# Patient Record
Sex: FEMALE | Race: OTHER | Employment: FULL TIME | URBAN - METROPOLITAN AREA
[De-identification: names, ages, dates, MRNs, and addresses within clinical notes are randomized per-mention and may not be internally consistent; named-entity substitution may affect disease eponyms.]

---

## 2017-12-12 ENCOUNTER — OFFICE VISIT (OUTPATIENT)
Dept: FAMILY MEDICINE CLINIC | Age: 22
End: 2017-12-12

## 2017-12-12 VITALS
WEIGHT: 113 LBS | OXYGEN SATURATION: 100 % | BODY MASS INDEX: 20.8 KG/M2 | TEMPERATURE: 97.4 F | RESPIRATION RATE: 20 BRPM | HEART RATE: 66 BPM | HEIGHT: 62 IN | SYSTOLIC BLOOD PRESSURE: 110 MMHG | DIASTOLIC BLOOD PRESSURE: 95 MMHG

## 2017-12-12 DIAGNOSIS — R10.10 PAIN OF UPPER ABDOMEN: ICD-10-CM

## 2017-12-12 DIAGNOSIS — R10.9 STOMACH PAIN: Primary | ICD-10-CM

## 2017-12-12 DIAGNOSIS — K62.5 RECTAL BLEEDING: ICD-10-CM

## 2017-12-12 LAB
BILIRUB UR QL STRIP: NEGATIVE
GLUCOSE UR-MCNC: NEGATIVE MG/DL
HCG URINE, QL. (POC): NEGATIVE
KETONES P FAST UR STRIP-MCNC: NEGATIVE MG/DL
PH UR STRIP: 7.5 [PH] (ref 4.6–8)
PROT UR QL STRIP: NEGATIVE
SP GR UR STRIP: 1.01 (ref 1–1.03)
UA UROBILINOGEN AMB POC: NORMAL (ref 0.2–1)
URINALYSIS CLARITY POC: CLEAR
URINALYSIS COLOR POC: NORMAL
URINE BLOOD POC: NORMAL
URINE LEUKOCYTES POC: NEGATIVE
URINE NITRITES POC: NEGATIVE
VALID INTERNAL CONTROL?: YES

## 2017-12-12 NOTE — MR AVS SNAPSHOT
Visit Information Date & Time Provider Department Dept. Phone Encounter #  
 12/12/2017  1:20 PM Ammy Robertson NP [facility] [phone] [id number] Upcoming Health Maintenance Date Due Hepatitis A Peds Age 1-18 (1 of 2 - Standard Series) 8/16/1998 DTaP/Tdap/Td series (1 - Tdap) 8/16/2004 HPV AGE 9Y-26Y (1 of 3 - Female 3 Dose Series) 8/16/2008 Influenza Age 5 to Adult 8/1/2017 Allergies as of 12/12/2017  Review Complete On: 12/12/2017 By: Ammy Robertson NP No Known Allergies Current Immunizations  Never Reviewed No immunizations on file. Not reviewed this visit You Were Diagnosed With   
  
 Codes Comments Stomach pain    -  Primary ICD-10-CM: R10.9 ICD-9-CM: 536.8 Rectal bleeding     ICD-10-CM: K62.5 ICD-9-CM: 569.3 Pain of upper abdomen     ICD-10-CM: R10.10 ICD-9-CM: 789.09 Vitals BP Pulse Temp Resp Height(growth percentile) Weight(growth percentile) (!) 110/95 (BP 1 Location: Left arm, BP Patient Position: Sitting) 66 97.4 °F (36.3 °C) (Oral) 20 5' 1.5\" (1.562 m) 113 lb (51.3 kg) SpO2 BMI OB Status Smoking Status 100% 21.01 kg/m2 Having regular periods Former Smoker BMI and BSA Data Body Mass Index Body Surface Area 21.01 kg/m 2 1.49 m 2 Preferred Pharmacy Pharmacy Name Phone [phone], [address] [phone] Your Updated Medication List  
  
Notice  As of 12/12/2017  2:29 PM  
 You have not been prescribed any medications. We Performed the Following AMB POC URINALYSIS DIP STICK AUTO W/O MICRO [06301 CPT(R)] AMB POC URINE PREGNANCY TEST, VISUAL COLOR COMPARISON [20221 CPT(R)] REFERRAL TO GASTROENTEROLOGY [VBC22 Custom] Comments:  
 Please evaluate and treat patient for chronic ab pain, rectal bleeding. To-Do List   
 12/13/2017 Imaging:  US ABD COMP   
  
 12/13/2017 Imaging:  US PELV NON OBS Referral Information Referral ID Referred By Referred To  
  
 [id number] [facility], [address]   
   [address] [facility] [address] [place], [address] [facility] Visits Status Start Date End Date 1 New Request 12/12/17 12/12/18 If your referral has a status of pending review or denied, additional information will be sent to support the outcome of this decision. Patient Instructions 1) Ultrasound of abdomen and pelvic regions 2) Referral to Dr. Santa Gill - GI specialist.  Please make an appointment to see him after you have scan done. 3) Please eat small, frequent meals. Avoid fried and fatty foods. Follow GERD guidelines:  
1. GERD can occur when stomach acid or food flows back into your esophagus, and can cause heartburn, sore throat, dry cough and other symptoms. 2. Anti-reflux measures that can be used include raising the head of the bed, avoiding tight clothing or belts, avoiding eating late at night and not lying down shortly after mealtime and losing weight. 2. Avoid aspirin, ibuprofen, caffeine, peppermints, alcohol and tobacco, as these can make GERD symptoms worse. 3. OTC H2 blockers (such as Prilosec, Pepcid AC, Zantac) and/or antacids (such as Maalox, Tums) are often very helpful for occasional use. However, for persisting chronic or daily symptoms occur, prescription strength H2 blockers or a trial of PPI's are often used. Introducing [facility]! Agustín Paris introduces [employer] patient portal. Now you can access parts of your medical record, email your doctor's office, and request medication refills online. 1. In your internet browser, go to [email or web address]. [email or web address] system 2. Click on the First Time User? Click Here link in the Sign In box. You will see the New Member Sign Up page. 3. Enter your [employer]Way Access Code exactly as it appears below.  You will not need to use this code after youve completed the sign-up process. If you do not sign up before the expiration date, you must request a new code. · [facility] Access Code: [email or web address] Expires: 3/12/2018  2:29 PM 
 
4. Enter the last four digits of your Social Security Number (xxxx) and Date of Birth (mm/dd/yyyy) as indicated and click Submit. You will be taken to the next sign-up page. 5. Create a [facility] ID. This will be your [facility] login ID and cannot be changed, so think of one that is secure and easy to remember. 6. Create a [facility] password. You can change your password at any time. 7. Enter your Password Reset Question and Answer. This can be used at a later time if you forget your password. 8. Enter your e-mail address. You will receive e-mail notification when new information is available in [address]. 9. Click Sign Up. You can now view and download portions of your medical record. 10. Click the Download Summary menu link to download a portable copy of your medical information. If you have questions, please visit the Frequently Asked Questions section of the [facility] website. Remember, [facility] is NOT to be used for urgent needs. For medical emergencies, dial 911. Now available from your iPhone and Android! Please provide this summary of care documentation to your next provider. If you have any questions after today's visit, please call [phone].

## 2017-12-12 NOTE — PATIENT INSTRUCTIONS
1) Ultrasound of abdomen and pelvic regions     2) Referral to Dr. Kenney Petersen - GI specialist.  Please make an appointment to see him after you have scan done. 3) Please eat small, frequent meals. Avoid fried and fatty foods. Follow GERD guidelines:   1. GERD can occur when stomach acid or food flows back into your esophagus, and can cause heartburn, sore throat, dry cough and other symptoms. 2. Anti-reflux measures that can be used include raising the head of the bed, avoiding tight clothing or belts, avoiding eating late at night and not lying down shortly after mealtime and losing weight. 2. Avoid aspirin, ibuprofen, caffeine, peppermints, alcohol and tobacco, as these can make GERD symptoms worse. 3. OTC H2 blockers (such as Prilosec, Pepcid AC, Zantac) and/or antacids (such as Maalox, Tums) are often very helpful for occasional use. However, for persisting chronic or daily symptoms occur, prescription strength H2 blockers or a trial of PPI's are often used.

## 2017-12-12 NOTE — PROGRESS NOTES
Chief Complaint   Patient presents with    Abdominal Pain      after eating, belch a lot x 2-3 months       Reviewed record in preparation for visit and have obtained necessary documentation. Identified pt with two pt identifiers(name and ). Chief Complaint   Patient presents with    Abdominal Pain      after eating, belch a lot x 2-3 months       Vitals:    17 1335   BP: (!) 110/95   Pulse: 66   Resp: 20   Temp: 97.4 °F (36.3 °C)   TempSrc: Oral   SpO2: 100%   Weight: 113 lb (51.3 kg)   Height: 5' 1.5\" (1.562 m)   PainSc:   0 - No pain       Coordination of Care Questionnaire:  :     1) Have you been to an emergency room, urgent care clinic since your last visit? no   Hospitalized since your last visit? no             2) Have you seen or consulted any other health care providers outside of [address] [place] [address] since your last visit? no  (Include any pap smears or colon screenings in this section.)    3) In the event something were to happen to you and you were unable to speak on your behalf, do you have an Advance Directive/ Living Will in place stating your wishes? YES    Do you have an Advance Directive on file? no    4) Are you interested in receiving information on Advance Directives? NO    Patient is accompanied by self I have received verbal consent from Vicky Estrada to discuss any/all medical information while they are present in the room. Verbal order to do pregnancy test was negative. Urine collected and resulted through dipstick.

## 2017-12-12 NOTE — PROGRESS NOTES
S: Gaurang Young is a 23 y.o. female who presents with ab pain    Assessment/Plan:  1. Stomach pain  -2+months stomach pain, worse with eating  -pancreatitis vs gastroenteritis vs   -preg neg; UA neg (except blood)  - US ab/pelvis    2.  Rectal bleeding  -has seen blood on toilet paper for past 4 months  -anal fissure vs hemorrhoid vs polyps   - referral to GI - to be scheduled after US is complete    RTC for CPE     HPI:  Sx for at least 2-3 months  Wakes up in am - has pain and a lot of belching  Almost every day when she eats - up to 1/2 hour after eating  Pain can last 1/2 hour up to several hours     Pain:   Can be 10/10, but not all the time    Type: sharp, shooting pain  Progression: sometimes will last 3 -4 hours  No Radiation  No flank pain  Food on stomach makes it worse  No real pain when stomach is empty   Anything make it better - Tums didn't help   Anything make it worse - food  No F/C  No N/V  Urine and BM normal       Gas - burping, but not flatuence   Last BM - today   No blood in stool, but will see blood on toilet paper (for past 4 months)   No pain or frequency on urination  No trouble eating or swallowing  +acid reflux    No hx of ab surgery  No Chest pain, SOB, pulsation near navel, back pain  No trauma  No new foods or medicine  No  NSAIDs  No recent travels     Has had gradual weight loss of about 10lbs since 20yo  Exercise: occasional - bikes and runs when able    No Chest pain, SOB, pulsation near navel, back pain    Vocal cords have been hurting  Voice has hurt - dc and can't sing the way she used to  Can't hit notes properly  No hoarseness  Feels like burning sensation/inflammation     LMP: 12/11/17  Preg - neg  UA - Moderate Blood (having menses)   Sexually Active - men only  Birth Control - uses pull out method - discussed risk of pregnancy and switching to condom use with spermacide  Discussed PAP smear at 23 unless sx    PHQ over the last two weeks 12/15/2015   Little interest or pleasure in doing things Not at all   Feeling down, depressed or hopeless Not at all   Total Score PHQ 2 0       Social History:  Occupation:  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 Still had stomach aches before that, but has gotten  (doesn't eat at work); will eat late, depending on shift  Breakfast: bagel, toast or   Lunch: salad with chicken, fast food  Dinner: pasta, burger, chicken sandwich  Drinks:water, coffee  Alcohol: rarely 1x month   Drugs:none  Tobacco: October 2017 quit; smoking 3-7 cigs/day for 2 years     Review of Systems:  - Constitutional Symptoms: no fevers, chills, +weight loss  - Cardiovascular: no chest pain or palpitations  - Respiratory: no cough or shortness of breath  - Neurological: no numbness, tingling, or headaches  - Psychiatric: no depression or anxiety    I reviewed the following:  History reviewed. No pertinent past medical history. No Known Allergies    O: VS:   Visit Vitals    BP (!) 110/95 (BP 1 Location: Left arm, BP Patient Position: Sitting)    Pulse 66    Temp 97.4 °F (36.3 °C) (Oral)    Resp 20    Ht 5' 1.5\" (1.562 m)    Wt 113 lb (51.3 kg)    SpO2 100%    BMI 21.01 kg/m2         Data Reviewed:  Urine Pregnancy Test: neg  UA - mod blood    GENERAL: [place]  is in no acute distress. Non-toxic. No signs of dehydration. EYE: PERRLA. EOMs intact. Sclera anicteric without injection. MOUTH: mucous membranes pink and moist. Posterior pharynx normal with cobblestone appearance. No erythema, white exudate or obstruction. RESP: Breath sounds are symmetrical bilaterally. Unlabored without SOB. Speaking in full sentences. Clear to auscultation bilaterally anteriorly and posteriorly. No wheezes. No rales or rhonchi. CV: normal rate. Regular rhythm. S1, S2 audible. No murmur noted. No rubs, clicks or gallops noted. ABDOMEN: Flat without bulges or pulsations.    Soft and nondistended. No tenderness on palpation. No masses or organomegaly. No rebound tenderness, rigidity or guarding. Bowel sounds normal x 4 quadrants. Special Tests:  McBurney Negative  Rosving Sign Negative; Psoas Sign: negative  Bahena's sign: negative  CVA tenderness: none  HEME/LYMPH: peripheral pulses palpable 2+ x 4 extremities. No peripheral edema is noted. No cervical adenopathy noted. SKIN: Skin is warm and dry. Turgor is normal. No petechiae, no purpura, no rash. No cyanosis. No mottling, jaundice or pallor. Patient education was done. Advised on nutrition, physical activity, tobacco, alcohol and safety. Counseling included discussion of diagnosis, differentials, treatment options, prescribed treatment, warning signs and follow up. Medication risks/benefits, interactions and alternatives discussed with patient.    ______________________________________________________________________  Patient verbalized understanding and agreed to plan of care. Patient was given an after visit summary which included current diagnoses, medications and vital signs. Follow up with GI for ab pain.  RTC for CPE

## 2019-01-28 ENCOUNTER — OFFICE VISIT (OUTPATIENT)
Dept: PRIMARY CARE CLINIC | Age: 24
End: 2019-01-28

## 2019-01-28 ENCOUNTER — HOSPITAL ENCOUNTER (EMERGENCY)
Age: 24
Discharge: HOME | End: 2019-01-28
Attending: EMERGENCY MEDICINE
Payer: COMMERCIAL

## 2019-01-28 VITALS
DIASTOLIC BLOOD PRESSURE: 70 MMHG | RESPIRATION RATE: 16 BRPM | OXYGEN SATURATION: 100 % | HEART RATE: 68 BPM | SYSTOLIC BLOOD PRESSURE: 109 MMHG | TEMPERATURE: 98.5 F

## 2019-01-28 VITALS
OXYGEN SATURATION: 98 % | WEIGHT: 127 LBS | HEIGHT: 62 IN | BODY MASS INDEX: 23.37 KG/M2 | TEMPERATURE: 98 F | HEART RATE: 73 BPM | RESPIRATION RATE: 16 BRPM | DIASTOLIC BLOOD PRESSURE: 66 MMHG | SYSTOLIC BLOOD PRESSURE: 101 MMHG

## 2019-01-28 DIAGNOSIS — R45.851 SUICIDAL THOUGHTS: ICD-10-CM

## 2019-01-28 DIAGNOSIS — F32.A DEPRESSION, UNSPECIFIED DEPRESSION TYPE: Primary | ICD-10-CM

## 2019-01-28 DIAGNOSIS — R45.851 VERBALIZES SUICIDAL THOUGHTS: ICD-10-CM

## 2019-01-28 DIAGNOSIS — J02.9 SORE THROAT: Primary | ICD-10-CM

## 2019-01-28 LAB
AMPHET UR QL SCN: NEGATIVE
BARBITURATES UR QL SCN: NEGATIVE
BENZODIAZ UR QL: NEGATIVE
CANNABINOIDS UR QL SCN: POSITIVE
COCAINE UR QL SCN: NEGATIVE
DRUG SCRN COMMENT,DRGCM: ABNORMAL
ETHANOL SERPL-MCNC: <10 MG/DL
HCG UR QL: NEGATIVE
METHADONE UR QL: NEGATIVE
OPIATES UR QL: NEGATIVE
PCP UR QL: NEGATIVE
S PYO AG THROAT QL: NEGATIVE
VALID INTERNAL CONTROL?: YES

## 2019-01-28 PROCEDURE — 80307 DRUG TEST PRSMV CHEM ANLYZR: CPT

## 2019-01-28 PROCEDURE — 81025 URINE PREGNANCY TEST: CPT

## 2019-01-28 PROCEDURE — 99285 EMERGENCY DEPT VISIT HI MDM: CPT

## 2019-01-28 NOTE — PROGRESS NOTES
History of Present Illness     Patient Identification  Marcello Ruiz is a 26 y.o. female. Patient information was obtained from patient. History/Exam limitations: none. Patient presented voluntarily to the [facility] by personal vehicle    Chief Complaint   Cold Symptoms (Cough, runny nose and sore throat starting Saturday, chills, fatigue)      Patient presents with depression and suicidal thoughts/threats. Onset of symptoms was many years ago,  The patient reports remembering as far back as when she was a young girl having thoughts of killing herself. Symptoms are of severe severity and are unstable. Patient states symptoms have been exacerbated by the patient is unable to say what has caused her thoughts. Symptoms are associated with intent to harm self has means to carry out plan and by carbon monoxide. History obtained from: patient and Patient expressed intent to harm self with definite plan of has means to carry out plan and carbon monoxide. . Care prior to arrival consisted of nothing, with no relief. History reviewed. No pertinent past medical history.   Family History   Problem Relation Age of Onset    Elevated Lipids Mother     Hypertension Mother    [facility] Elevated Lipids Maternal Grandmother     Elevated Lipids Maternal Grandfather     No Known Problems Father        No Known Allergies  Social History     Socioeconomic History    Marital status: SINGLE     Spouse name: Not on file    Number of children: Not on file    Years of education: Not on file    Highest education level: Not on file   Social Needs    Financial resource strain: Not on file    Food insecurity - worry: Not on file    Food insecurity - inability: Not on file   [employer] needs - medical: Not on file   [employer] needs - non-medical: Not on file   Occupational History    Not on file   Tobacco Use    Smoking status: Former Smoker    Smokeless tobacco: Never Used   Substance and Sexual Activity    Alcohol use: Yes     Alcohol/week: 1.2 oz     Types: 2 Glasses of wine per week    Drug use: No    Sexual activity: Not on file   Other Topics Concern    Not on file   Social History Narrative    Not on file     Review of Systems   A comprehensive review of systems was negative except for that written in the HPI. Physical Exam     Visit Vitals  /66   Pulse 73   Temp 98 °F (36.7 °C) (Oral)   Resp 16   Ht 5' 1.5\" (1.562 m)   Wt 127 lb (57.6 kg)   LMP 12/26/2018   SpO2 98%   BMI 23.61 kg/m²     Visit Vitals  /66   Pulse 73   Temp 98 °F (36.7 °C) (Oral)   Resp 16   Ht 5' 1.5\" (1.562 m)   Wt 127 lb (57.6 kg)   LMP 12/26/2018   SpO2 98%   BMI 23.61 kg/m²     General:  Alert, cooperative, no distress, appears stated age. Head:  Normocephalic, without obvious abnormality, atraumatic. Eyes:  Conjunctivae/corneas clear. PERRL, EOMs intact. Fundi benign. Ears:  Normal TMs and external ear canals both ears. Nose: Nares normal. Septum midline. Mucosa normal. No drainage or sinus tenderness. Throat: Lips, mucosa, and tongue normal. Teeth and gums normal.   Neck: Supple, symmetrical, trachea midline, no adenopathy, thyroid: no enlargement/tenderness/nodules, no carotid bruit and no JVD. Back:   Symmetric, no curvature. ROM normal. No CVA tenderness. Lungs:   Clear to auscultation bilaterally. Chest wall:  No tenderness or deformity. Heart:  Regular rate and rhythm, S1, S2 normal, no murmur, click, rub or gallop. Breast Exam:  No tenderness, masses, or nipple abnormality. Abdomen:   Soft, non-tender. Bowel sounds normal. No masses,  No organomegaly. Genitalia:  Normal female without lesion, discharge or tenderness. Rectal:  Normal tone,  no masses or tenderness  Guaiac negative stool. Extremities: Extremities normal, atraumatic, no cyanosis or edema. Pulses: 2+ and symmetric all extremities. Skin: Skin color, texture, turgor normal. No rashes or lesions.    Lymph nodes: Cervical, supraclavicular, and axillary nodes normal.   Neurologic: CNII-XII intact. Normal strength, sensation and reflexes throughout. ED Course     Studies: None indicated. Records Reviewed: Old medical records. Treatments: None.     Consultations: contacted [facility] and they came to this office, spoke with the patient, and the patient voluntarily went with them to the crisis unit for evaluation    Disposition: patient taken to ED [facility] crisis unit by Trixie Kaye

## 2019-01-28 NOTE — ED PROVIDER NOTES
EMERGENCY DEPARTMENT HISTORY AND PHYSICAL EXAM 
 
 
Date: 1/28/2019 Patient Name: Dl Bolton History of Presenting Illness Chief Complaint Patient presents with  Mental Health Problem  
  suicidal Ideation History Provided By: Patient HPI: lD Bolton, 26 y.o. female with PMHx significant for depression, presents via police as an ECO to the ED for evaluation of the pt's SI. Patient was at the PCP to be seen for a cold and was asked depression screening questions and when they were positive, was referred to the ED. The pt states that she was planning to inhale CO2 via a CO2 tank in order to kill herself. She reports being depressed intermittently for the past couple years but has been constantly depressed for the past couple months. She reports having old self-inflicted cut wounds on her bilateral wrists, but denies any recent cut marks. She denies taking psychiatric medications, or seeing a therapist/psychiatrist. The pt reports that she would like to get help for her depression. The pt specifically denies experiencing abdominal pain, urinary sxs, fever, cough, chills, SOB, HA, CP, or N/V/D. PMHx: depression SHx: + EtOH, - tobacco, - illicit drugs There are no other complaints, changes, or physical findings at this time. PCP: None No current facility-administered medications on file prior to encounter. No current outpatient medications on file prior to encounter. Past History Past Medical History: No past medical history on file. Past Surgical History: No past surgical history on file. Family History: 
Family History Problem Relation Age of Onset  Elevated Lipids Mother  Hypertension Mother  Elevated Lipids Maternal Grandmother  Elevated Lipids Maternal Grandfather  No Known Problems Father Social History: 
Social History Tobacco Use  Smoking status: Former Smoker  Smokeless tobacco: Never Used Substance Use Topics  Alcohol use: Yes Alcohol/week: 1.2 oz Types: 2 Glasses of wine per week  Drug use: No  
 
 
Allergies: 
No Known Allergies Review of Systems Review of Systems Constitutional: Negative for chills and fever. HENT: Negative for congestion, rhinorrhea and sore throat. Respiratory: Negative for cough and shortness of breath. Cardiovascular: Negative for chest pain. Gastrointestinal: Negative for abdominal pain, diarrhea, nausea and vomiting. Genitourinary: Negative for dysuria and urgency. Skin: Negative for rash. Neurological: Negative for dizziness, light-headedness and headaches. Psychiatric/Behavioral: Positive for suicidal ideas. All other systems reviewed and are negative. Physical Exam  
Physical Exam  
Constitutional: She is oriented to person, place, and time. She appears well-developed and well-nourished. No distress. HENT:  
Head: Normocephalic and atraumatic. Eyes: Conjunctivae and EOM are normal. Pupils are equal, round, and reactive to light. Neck: Normal range of motion. Cardiovascular: Normal rate, regular rhythm and intact distal pulses. Pulmonary/Chest: Effort normal and breath sounds normal. No stridor. No respiratory distress. Abdominal: Soft. She exhibits no distension. There is no tenderness. Musculoskeletal: Normal range of motion. Neurological: She is alert and oriented to person, place, and time. Skin: Skin is warm and dry. Psychiatric: She has a normal mood and affect. She expresses suicidal ideation. She expresses suicidal plans. Nursing note and vitals reviewed. Diagnostic Study Results Labs - Recent Results (from the past 12 hour(s)) AMB POC RAPID STREP A Collection Time: 01/28/19  3:02 PM  
Result Value Ref Range VALID INTERNAL CONTROL POC Yes Group A Strep Ag Negative Negative ETHYL ALCOHOL Collection Time: 01/28/19  5:37 PM  
Result Value Ref Range ALCOHOL(ETHYL),SERUM <10 <10 MG/DL  
DRUG SCREEN, URINE Collection Time: 01/28/19  5:37 PM  
Result Value Ref Range AMPHETAMINES NEGATIVE  NEG    
 BARBITURATES NEGATIVE  NEG BENZODIAZEPINES NEGATIVE  NEG    
 COCAINE NEGATIVE  NEG METHADONE NEGATIVE  NEG    
 OPIATES NEGATIVE  NEG    
 PCP(PHENCYCLIDINE) NEGATIVE  NEG    
 THC (TH-CANNABINOL) POSITIVE (A) NEG Drug screen comment (NOTE) HCG URINE, QL. - POC Collection Time: 01/28/19  6:44 PM  
Result Value Ref Range Pregnancy test,urine (POC) NEGATIVE  NEG Radiologic Studies - No orders to display CT Results  (Last 48 hours) None CXR Results  (Last 48 hours) None Medical Decision Making I am the first provider for this patient. I reviewed the vital signs, available nursing notes, past medical history, past surgical history, family history and social history. Vital Signs-Reviewed the patient's vital signs. Patient Vitals for the past 12 hrs: 
 Temp Pulse Resp BP SpO2  
01/28/19 1919 98.5 °F (36.9 °C) 68 16 109/70   
01/28/19 1843 98.3 °F (36.8 °C) 63 16 103/64 100 % 01/28/19 1651 97.4 °F (36.3 °C) 76 18 104/81 98 % Records Reviewed: Nursing Notes and Old Medical Records Provider Notes (Medical Decision Making): On exam, patient is pleasant, well appearing. Plan to get UDS, Upreg, Etoh level. Crisis is in the ED to talk to the patient ED Course:  
Initial assessment performed. The patients presenting problems have been discussed, and they are in agreement with the care plan formulated and outlined with them. I have encouraged them to ask questions as they arise throughout their visit. PROGRESS NOTE: 
5:52 PM 
CRISIS states that a safety plan is being put in place, they are waiting to talk to one more family member Written by ISAEL Xie, as dictated by Fernando Ramos.  MD Jazmine. 
 
PROGRESS NOTE: 
6:16 PM 
 CRISIS reports that they have completed the safety plan and she is okay for discharge. Patient feels better given the follow up provided. Has support at home. Will d/c with strict return precautions Written by Zachariah Trujillo, ED Scribe, as dictated by Fernando Ramos. Pamella Reis MD. Critical Care Time:  
0 minutes Disposition: 
DISCHARGE NOTE 
7:15 PM 
The patient has been re-evaluated and is ready for discharge. Reviewed available results with patient. Counseled pt on diagnosis and care plan. Pt has expressed understanding, and all questions have been answered. Pt agrees with plan and agrees to F/U as recommended, or return to the ED if their sxs worsen. Discharge instructions have been provided and explained to the pt, along with reasons to return to the ED. Written by Zachariah Trujillo, ISAEL Scribe, as dictated by Fernando Ramos. MD Jazmine. 
 
PLAN: 
1. There are no discharge medications for this patient. 2.  
Follow-up Information Follow up With Specialties Details Why Contact Info [address] [place] [address] 
[phone] [facility] EMERGENCY DEPT Emergency Medicine  As needed, If symptoms worsen [address] [place] [address] 
[phone] Return to ED if worse Diagnosis Clinical Impression: 1. Depression, unspecified depression type Attestation: This note is prepared by Zachariah Trujillo, acting as Scribe for Fernando Ramos. MD Fernando Lucero. MD Jazmine: The scribe's documentation has been prepared under my direction and personally reviewed by me in its entirety. I confirm that the note above accurately reflects all work, treatment, procedures, and medical decision making performed by me.

## 2019-01-28 NOTE — PATIENT INSTRUCTIONS
Suicidal Thoughts and Behavior: Care Instructions  Your Care Instructions  You have been seen by a doctor because you've had thoughts about killing yourself. Maybe you have tried to do it. This is much different from having fleeting thoughts of death, which many people have from time to time. Your doctor and support team will work hard to help keep you safe. Your team may include a , a , and a counselor. Most people who think about suicide don't want to die. They think suicide will end their problems and pain. People who consider suicide often feel hopeless, helpless, and worthless. These thoughts can make a person feel that there is no other choice. But you do have a choice. Help is always available. The doctor and staff members are taking you and your pain very seriously. It is important to remember that there are people who are willing and able to talk with you about your suicidal thoughts. Treatment and close follow-up care can help you feel better about life. Thoughts of hopelessness and suicide may come from being depressed. Depression is a medical condition. When you have depression, there may be problems with activity levels in certain parts of your brain. Chemicals in your brain called neurotransmitters may be out of balance. But depression can be treated. Treatment for depression includes counseling, medicines, and lifestyle changes. With treatment, you can feel better. Your doctor doesn't want you to hurt yourself. He or she may ask you to sign a \"no harm\" agreement or contract. This contract is your promise that you will not hurt yourself between now and your next visit. Be completely honest with your doctor if you feel that you can't sign it. You will get help. Follow-up care is a key part of your treatment and safety. Be sure to make and go to all appointments, and call your doctor if you are having problems.  It's also a good idea to know your test results and keep a list of the medicines you take. How can you care for yourself at home? · Talk to someone. Reach out to family members, friends, your doctor, or a counselor. Be open and honest with them about your thoughts and feelings. · Be safe with medicines. Take your medicines exactly as prescribed. Call your doctor if you think you are having a problem with your medicine. · Avoid illegal drugs and alcohol. · Attend all counseling sessions recommended by your doctor. · Have someone take away sharp or dangerous objects, guns, and drugs from your home. · Keep the numbers for these national suicide hotlines: 6-462-358-TALK (2-319.484.4276) and 3-165-JTMUXRK (4-512.812.1117). When should you call for help? Call 911 anytime you think you may need emergency care. For example, call if:    · You feel you cannot stop from hurting yourself or someone else.   [facility] your doctor now or seek immediate medical care if:    · You have one or more warning signs of suicide. For example, call if:  ? You feel like giving away your possessions. ? You use illegal drugs or drink alcohol heavily. ? You talk or write about death. This may include writing suicide notes and talking about guns, knives, or pills. ? You start to spend a lot of time alone or spend more time alone than usual.     · You hear voices.     · You start acting in an aggressive way that's not normal for you.    Watch closely for changes in your health, and be sure to contact your doctor if you have any problems. Where can you learn more? Go to http://merced-gilmar.info/. Enter R301 in the search box to learn more about \"Suicidal Thoughts and Behavior: Care Instructions. \"  Current as of: September 11, 2018  Content Version: 11.9  © 0207-4528 [employer]. Care instructions adapted under license by [employer] (which disclaims liability or warranty for this information).  If you have questions about a medical condition or this instruction, always ask your healthcare professional. [place] [address] any warranty or liability for your use of this information.

## 2019-01-28 NOTE — LETTER
[address] 
[facility] EMERGENCY DEPT 
[address] [place] [address] [address] [phone] 
[phone] Work/School Note Date: 1/28/2019 To Whom It May concern: 
 
Emmanuel Meneses was seen and treated today in the emergency room by the following provider(s): 
Attending Provider: Ayala Lagos MD.

## 2019-01-28 NOTE — PROGRESS NOTES
Chief Complaint   Patient presents with    Cold Symptoms     Cough, runny nose and sore throat starting Saturday, chills, fatigue

## 2019-01-28 NOTE — PROGRESS NOTES
Pt reports 3 day history of cough, sore throat, has been at work around one person with sinus infection. Denies any fever/chills/n/v/d/. After speaking with the patient, 911 has been called and they are sending a deputy to this location. [place] Deputies are on scene, have been briefed, and now are talking with the patient. The patient was tearful but willing to talk with them. This patient comes in for a sore throat, coughing and just generalized not feeling good. In completing the depression screening her score were elevated. I began to discuss with her why she was here and how she was feeling she would not make eye contact, she has a flat affect, and a somber disposition. When discussing her thoughts she reports \"I have had these thoughts for a very long time\" and when asked about her plan she reports \"I was going to do carbon monoxide\" when asked about today and if she was currently thinking about suicide she reports \"yeah, I think about this all the time these days\", when asked if she felt safe at home she reports\"I feel safe at home, I live with my boyfriend and his Mom\" when asked about her relationship with her boyfriend she reports \"I guess we are good\" when I asked if she had been hurt in any way she reports \"no, not really\"  The patient would not elaborate, she still does not make eye contact and she had a somber disposition. When asked if she had ever attempted suicide before she reports \"shrugged her shoulders but did not answer\".     Patient voluntarily went with [facility] for psych evaluation

## 2019-01-29 NOTE — ED NOTES
Emergency Department Nursing Discharge Note: 
 
Discharge instructions provided by Dr. Claudia Perdue. Patient verbalized understanding. Patient ambulatory out of ED with steady gait. Personal transportation home.

## 2019-01-29 NOTE — DISCHARGE INSTRUCTIONS
Patient Education        Recovering From Depression: Care Instructions  Your Care Instructions    Taking good care of yourself is important as you recover from depression. In time, your symptoms will fade as your treatment takes hold. Do not give up. Instead, focus your energy on getting better. Your mood will improve. It just takes some time. Focus on things that can help you feel better, such as being with friends and family, eating well, and getting enough rest. But take things slowly. Do not do too much too soon. You will begin to feel better gradually. Follow-up care is a key part of your treatment and safety. Be sure to make and go to all appointments, and call your doctor if you are having problems. It's also a good idea to know your test results and keep a list of the medicines you take. How can you care for yourself at home? Be realistic  · If you have a large task to do, break it up into smaller steps you can handle, and just do what you can. · You may want to put off important decisions until your depression has lifted. If you have plans that will have a major impact on your life, such as marriage, divorce, or a job change, try to wait a bit. Talk it over with friends and loved ones who can help you look at the overall picture first.  · Reaching out to people for help is important. Do not isolate yourself. Let your family and friends help you. Find someone you can trust and confide in, and talk to that person. · Be patient, and be kind to yourself. Remember that depression is not your fault and is not something you can overcome with willpower alone. Treatment is necessary for depression, just like for any other illness. Feeling better takes time, and your mood will improve little by little. Stay active  · Stay busy and get outside. Take a walk, or try some other light exercise. · Talk with your doctor about an exercise program. Exercise can help with mild depression. · Go to a movie or concert. Take part in a Religious activity or other social gathering. Go to a [employer] game. · Ask a friend to have dinner with you. Take care of yourself  · Eat a balanced diet with plenty of fresh fruits and vegetables, whole grains, and lean protein. If you have lost your appetite, eat small snacks rather than large meals. · Avoid drinking alcohol or using illegal drugs. Do not take medicines that have not been prescribed for you. They may interfere with medicines you may be taking for depression, or they may make your depression worse. · Take your medicines exactly as they are prescribed. You may start to feel better within 1 to 3 weeks of taking antidepressant medicine. But it can take as many as 6 to 8 weeks to see more improvement. If you have questions or concerns about your medicines, or if you do not notice any improvement by 3 weeks, talk to your doctor. · If you have any side effects from your medicine, tell your doctor. Antidepressants can make you feel tired, dizzy, or nervous. Some people have dry mouth, constipation, headaches, sexual problems, or diarrhea. Many of these side effects are mild and will go away on their own after you have been taking the medicine for a few weeks. Some may last longer. Talk to your doctor if side effects are bothering you too much. You might be able to try a different medicine. · Get enough sleep. If you have problems sleeping:  ? Go to bed at the same time every night, and get up at the same time every morning. ? Keep your bedroom dark and quiet. ? Do not exercise after 5:00 p.m.  ? Avoid drinks with caffeine after 5:00 p.m. · Avoid sleeping pills unless they are prescribed by the doctor treating your depression. Sleeping pills may make you groggy during the day, and they may interact with other medicine you are taking. · If you have any other illnesses, such as diabetes, heart disease, or high blood pressure, make sure to continue with your treatment.  Tell your doctor about all of the medicines you take, including those with or without a prescription. · Keep the numbers for these national suicide hotlines: 3-141-018-TALK (1-432.461.4222) and 5-440-GCXPQBA (3-250.935.7995). If you or someone you know talks about suicide or feeling hopeless, get help right away. When should you call for help? Call 911 anytime you think you may need emergency care. For example, call if:    · You feel like hurting yourself or someone else.     · Someone you know has depression and is about to attempt or is attempting suicide.   [facility] your doctor now or seek immediate medical care if:    · You hear voices.     · Someone you know has depression and:  ? Starts to give away his or her possessions. ? Uses illegal drugs or drinks alcohol heavily. ? Talks or writes about death, including writing suicide notes or talking about guns, knives, or pills. ? Starts to spend a lot of time alone. ? Acts very aggressively or suddenly appears calm.    Watch closely for changes in your health, and be sure to contact your doctor if:    · You do not get better as expected. Where can you learn more? Go to http://merced-gilmar.info/. Enter U101 in the search box to learn more about \"Recovering From Depression: Care Instructions. \"  Current as of: September 11, 2018  Content Version: 11.9  © 1869-6216 [employer]. Care instructions adapted under license by [employer] (which disclaims liability or warranty for this information). If you have questions about a medical condition or this instruction, always ask your healthcare professional. [address] any warranty or liability for your use of this information.